# Patient Record
Sex: FEMALE | Race: AMERICAN INDIAN OR ALASKA NATIVE
[De-identification: names, ages, dates, MRNs, and addresses within clinical notes are randomized per-mention and may not be internally consistent; named-entity substitution may affect disease eponyms.]

---

## 2020-04-30 ENCOUNTER — HOSPITAL ENCOUNTER (EMERGENCY)
Dept: HOSPITAL 5 - ED | Age: 20
Discharge: HOME | End: 2020-04-30
Payer: MEDICAID

## 2020-04-30 VITALS — DIASTOLIC BLOOD PRESSURE: 59 MMHG | SYSTOLIC BLOOD PRESSURE: 134 MMHG

## 2020-04-30 DIAGNOSIS — O26.891: ICD-10-CM

## 2020-04-30 DIAGNOSIS — Z79.899: ICD-10-CM

## 2020-04-30 DIAGNOSIS — O21.0: Primary | ICD-10-CM

## 2020-04-30 DIAGNOSIS — J45.909: ICD-10-CM

## 2020-04-30 DIAGNOSIS — F12.10: ICD-10-CM

## 2020-04-30 DIAGNOSIS — O21.8: ICD-10-CM

## 2020-04-30 DIAGNOSIS — Z3A.08: ICD-10-CM

## 2020-04-30 DIAGNOSIS — R42: ICD-10-CM

## 2020-04-30 DIAGNOSIS — Z88.8: ICD-10-CM

## 2020-04-30 LAB
BACTERIA #/AREA URNS HPF: (no result) /HPF
BASOPHILS # (AUTO): 0 K/MM3 (ref 0–0.1)
BASOPHILS NFR BLD AUTO: 0.4 % (ref 0–1.8)
BILIRUB UR QL STRIP: (no result)
BLOOD UR QL VISUAL: (no result)
BUN SERPL-MCNC: 8 MG/DL (ref 7–17)
BUN/CREAT SERPL: 16 %
CALCIUM SERPL-MCNC: 10.1 MG/DL (ref 8.4–10.2)
EOSINOPHIL # BLD AUTO: 0.1 K/MM3 (ref 0–0.4)
EOSINOPHIL NFR BLD AUTO: 1.3 % (ref 0–4.3)
HCT VFR BLD CALC: 40.3 % (ref 30.3–42.9)
HEMOLYSIS INDEX: 26
HGB BLD-MCNC: 13.5 GM/DL (ref 10.1–14.3)
LYMPHOCYTES # BLD AUTO: 1.5 K/MM3 (ref 1.2–5.4)
LYMPHOCYTES NFR BLD AUTO: 16.2 % (ref 13.4–35)
MCHC RBC AUTO-ENTMCNC: 34 % (ref 30–34)
MCV RBC AUTO: 91 FL (ref 79–97)
MONOCYTES # (AUTO): 0.7 K/MM3 (ref 0–0.8)
MONOCYTES % (AUTO): 7 % (ref 0–7.3)
MUCOUS THREADS #/AREA URNS HPF: (no result) /HPF
PH UR STRIP: 7 [PH] (ref 5–7)
PLATELET # BLD: 208 K/MM3 (ref 140–440)
PROT UR STRIP-MCNC: (no result) MG/DL
RBC # BLD AUTO: 4.45 M/MM3 (ref 3.65–5.03)
RBC #/AREA URNS HPF: 1 /HPF (ref 0–6)
UROBILINOGEN UR-MCNC: < 2 MG/DL (ref ?–2)
WBC #/AREA URNS HPF: 1 /HPF (ref 0–6)

## 2020-04-30 PROCEDURE — 81001 URINALYSIS AUTO W/SCOPE: CPT

## 2020-04-30 PROCEDURE — 96361 HYDRATE IV INFUSION ADD-ON: CPT

## 2020-04-30 PROCEDURE — 81025 URINE PREGNANCY TEST: CPT

## 2020-04-30 PROCEDURE — 84702 CHORIONIC GONADOTROPIN TEST: CPT

## 2020-04-30 PROCEDURE — 85025 COMPLETE CBC W/AUTO DIFF WBC: CPT

## 2020-04-30 PROCEDURE — 36415 COLL VENOUS BLD VENIPUNCTURE: CPT

## 2020-04-30 PROCEDURE — 80048 BASIC METABOLIC PNL TOTAL CA: CPT

## 2020-04-30 PROCEDURE — 99283 EMERGENCY DEPT VISIT LOW MDM: CPT

## 2020-04-30 PROCEDURE — 96374 THER/PROPH/DIAG INJ IV PUSH: CPT

## 2020-04-30 NOTE — EMERGENCY DEPARTMENT REPORT
ED N/V/D HPI





- General


Chief complaint: Dizziness


Stated complaint: 8 WKS PREGNANT/DIZZINESS


Time Seen by Provider: 20 13:30


Source: patient


Mode of arrival: Ambulatory


Limitations: No Limitations





- History of Present Illness


Initial comments: 





This is a 19-year-old female  nontoxic, well nourished in appearance, no 

acute signs of distress presents to the ED with c/o of dizziness. nausea and 

vomiting 1 week.  Patient stated that she is about 8 weeks pregnant.  Denies 

any vaginal bleeding, pelvic pain, or flank pain.   Patient describes vomiting 

as food content.  Patient denies any abdominal pain, chest pain, short of 

breath, fever, chills, headache, stiff neck, numbness or tingling.  Patient 

denies any diarrhea or constipation.  Denies any blood in stool. Patient denies 

any recent travels.  Patient stated allergies to lorazepam.  


MD complaint: nausea, vomiting, other (dizziness)


-: week(s)


Description of Vomiting: food contents


Associated Abdominal Pain: No


Radiation: none


Improves with: none


Worsens with: none


Associated Symptoms: nausea/vomiting.  denies: myalgias, chest pain, cough, 

diaphoresis, fever/chills, headaches, loss of appetite, malaise, rash, dysuria, 

shortness of breath, syncope, weakness





- Related Data


                                  Previous Rx's











 Medication  Instructions  Recorded  Last Taken  Type


 


Metoclopramide [Reglan] 10 mg PO BID PRN #12 tab 20 Unknown Rx


 


Prenatal 21/Iron Fu/Folic Acid 1 each PO DAILY #30 tablet 20 Unknown Rx





[Prenatal Complete Caplet]    











                                    Allergies











Allergy/AdvReac Type Severity Reaction Status Date / Time


 


lorazepam [From Ativan] Allergy  Rash Verified 20 12:53














ED Review of Systems


ROS: 


Stated complaint: 8 WKS PREGNANT/DIZZINESS


Other details as noted in HPI





Constitutional: denies: chills, fever


Eyes: denies: eye pain, eye discharge, vision change


ENT: denies: ear pain, throat pain


Respiratory: denies: cough, shortness of breath, wheezing


Cardiovascular: denies: chest pain, palpitations


Endocrine: no symptoms reported


Gastrointestinal: nausea, vomiting.  denies: abdominal pain, diarrhea, 

constipation, hematemesis, melena, hematochezia


Genitourinary: denies: urgency, dysuria, discharge


Musculoskeletal: denies: back pain, joint swelling, arthralgia


Skin: denies: rash, lesions


Neurological: other (dizziness).  denies: headache, weakness, numbness, 

paresthesias, confusion, abnormal gait, vertigo


Psychiatric: denies: anxiety, depression


Hematological/Lymphatic: denies: easy bleeding, easy bruising





ED Past Medical Hx





- Past Medical History


Hx Asthma: Yes





- Surgical History


Past Surgical History?: No





- Social History


Smoking Status: Never Smoker


Substance Use Type: Marijuana





- Medications


Home Medications: 


                                Home Medications











 Medication  Instructions  Recorded  Confirmed  Last Taken  Type


 


Metoclopramide [Reglan] 10 mg PO BID PRN #12 tab 20  Unknown Rx


 


Prenatal 21/Iron Fu/Folic Acid 1 each PO DAILY #30 tablet 20  Unknown Rx





[Prenatal Complete Caplet]     














ED Physical Exam





- General


Limitations: No Limitations


General appearance: alert, in no apparent distress





- Head


Head exam: Present: atraumatic, normocephalic





- Eye


Eye exam: Present: normal appearance, PERRL, EOMI





- Neck


Neck exam: Present: normal inspection, full ROM





- GI/Abdominal


GI/Abdominal exam: Present: soft, normal bowel sounds.  Absent: distended, 

tenderness, guarding, rebound, rigid, diminished bowel sounds





- Extremities Exam


Extremities exam: Present: normal inspection, full ROM





- Back Exam


Back exam: Present: normal inspection, full ROM.  Absent: tenderness, CVA 

tenderness (R), CVA tenderness (L), muscle spasm, paraspinal tenderness, 

vertebral tenderness, rash noted





- Neurological Exam


Neurological exam: Present: alert, oriented X3, normal gait





- Expanded Neurological Exam


  ** Expanded


Patient oriented to: Present: person, place, time


Cranial nerves: EOM's Intact: Normal, Facial Sensation: Normal


Cerebellar function: Finger to Nose: Normal


Upper motor neuron: Pronator Drift: Normal, Sensory Extinction: Normal


Motor strength exam: RUE: 5, LUE: 5, RLE: 5, LLE: 5


Best Eye Response (Laurie): (4) open spontaneously


Best Motor Response (Piqua): (6) obeys commands


Best Verbal Response (Laurie): (5) oriented


Laurie Total: 15





- Psychiatric


Psychiatric exam: Present: normal affect, normal mood





- Skin


Skin exam: Present: warm, dry, intact, normal color.  Absent: rash





ED Course


                                   Vital Signs











  20





  12:53


 


Temperature 98.1 F


 


Pulse Rate 83


 


Respiratory 18





Rate 


 


Blood Pressure 134/59


 


O2 Sat by Pulse 100





Oximetry 














- Reevaluation(s)


Reevaluation #1: 





20 14:07


Patient is speaking in full sentences with no signs of distress noted.





ED Medical Decision Making





- Lab Data


Result diagrams: 


                                 20 13:44





                                 20 13:44





- Medical Decision Making





This is a 19-year-old female that presents with hyperemesis gravidarum.  Patient

is stable and was examined by me. There is no abdominal tenderness. Negative 

signs of symptoms of appendicitis, cholecystitis or acute abdomen.  Labs 

obtained. UA obtained. Vital signs are stable prior to discharge.  Patient 

received Reglan and 1L Normal saline in the ED which patient stated symptoms has

resovled and subsided.  A by mouth challenge has been obtained and patient 

tolerated well with no nausea vomiting.   Patient was also instructed to Follow-

up with a OBGYN doctor in 3-5 days or if symptoms worsen and continue return to 

emergency room as soon as possible.  At time of discharge, the patient does not 

seem toxic or ill in appearance.  No acute signs of distress noted.  Patient 

agrees to discharge treatment plan of care.  No further questions noted by the 

patient.


Critical care attestation.: 


If time is entered above; I have spent that time in minutes in the direct care 

of this critically ill patient, excluding procedure time.








ED Disposition


Clinical Impression: 


 Hyperemesis gravidarum





Disposition: DC-01 TO HOME OR SELFCARE


Is pt being admited?: No


Does the pt Need Aspirin: No


Condition: Stable


Instructions:  Hyperemesis Gravidarum (ED)


Additional Instructions: 


 Follow-up with a OBGYN doctor in 3-5 days or if symptoms worsen and continue r

eturn to emergency room as soon as possible.  


Prescriptions: 


Prenatal 21/Iron Fu/Folic Acid [Prenatal Complete Caplet] 1 each PO DAILY #30 

tablet


Metoclopramide [Reglan] 10 mg PO BID PRN #12 tab


 PRN Reason: Nausea


Referrals: 


[facility] [Referring] - 3-5 Days


JOSE RAFAEL NAVA MD [Staff Physician] - 3-5 Days


[facility] [Provider Group] - 3-5 Days


Forms:  Work/School Release Form(ED)